# Patient Record
Sex: MALE | Race: WHITE | NOT HISPANIC OR LATINO
[De-identification: names, ages, dates, MRNs, and addresses within clinical notes are randomized per-mention and may not be internally consistent; named-entity substitution may affect disease eponyms.]

---
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
## 2021-12-21 ENCOUNTER — APPOINTMENT (OUTPATIENT)
Dept: URBAN - METROPOLITAN AREA CLINIC 186 | Age: 21
Setting detail: DERMATOLOGY
End: 2021-12-21

## 2021-12-21 DIAGNOSIS — D18.0 HEMANGIOMA: ICD-10-CM

## 2021-12-21 DIAGNOSIS — D22 MELANOCYTIC NEVI: ICD-10-CM

## 2021-12-21 DIAGNOSIS — Q83.3 ACCESSORY NIPPLE: ICD-10-CM

## 2021-12-21 DIAGNOSIS — L81.4 OTHER MELANIN HYPERPIGMENTATION: ICD-10-CM

## 2021-12-21 PROBLEM — D22.39 MELANOCYTIC NEVI OF OTHER PARTS OF FACE: Status: ACTIVE | Noted: 2021-12-21

## 2021-12-21 PROBLEM — D22.61 MELANOCYTIC NEVI OF RIGHT UPPER LIMB, INCLUDING SHOULDER: Status: ACTIVE | Noted: 2021-12-21

## 2021-12-21 PROBLEM — D22.5 MELANOCYTIC NEVI OF TRUNK: Status: ACTIVE | Noted: 2021-12-21

## 2021-12-21 PROBLEM — D18.01 HEMANGIOMA OF SKIN AND SUBCUTANEOUS TISSUE: Status: ACTIVE | Noted: 2021-12-21

## 2021-12-21 PROBLEM — D22.62 MELANOCYTIC NEVI OF LEFT UPPER LIMB, INCLUDING SHOULDER: Status: ACTIVE | Noted: 2021-12-21

## 2021-12-21 PROCEDURE — 99213 OFFICE O/P EST LOW 20 MIN: CPT

## 2021-12-21 PROCEDURE — OTHER COUNSELING: OTHER

## 2021-12-21 PROCEDURE — OTHER MIPS QUALITY: OTHER

## 2021-12-21 PROCEDURE — OTHER REASSURANCE: OTHER

## 2021-12-21 PROCEDURE — OTHER SUNSCREEN TREATMENT REGIMEN: OTHER

## 2021-12-21 PROCEDURE — OTHER SUNSCREEN RECOMMENDATIONS: OTHER

## 2021-12-21 ASSESSMENT — LOCATION SIMPLE DESCRIPTION DERM
LOCATION SIMPLE: RIGHT UPPER BACK
LOCATION SIMPLE: RIGHT CHEEK
LOCATION SIMPLE: RIGHT SCALP
LOCATION SIMPLE: RIGHT UPPER ARM
LOCATION SIMPLE: CHEST
LOCATION SIMPLE: ABDOMEN
LOCATION SIMPLE: LEFT UPPER ARM
LOCATION SIMPLE: LEFT UPPER BACK

## 2021-12-21 ASSESSMENT — LOCATION ZONE DERM
LOCATION ZONE: SCALP
LOCATION ZONE: FACE
LOCATION ZONE: ARM
LOCATION ZONE: TRUNK

## 2021-12-21 ASSESSMENT — LOCATION DETAILED DESCRIPTION DERM
LOCATION DETAILED: RIGHT LATERAL ABDOMEN
LOCATION DETAILED: PERIUMBILICAL SKIN
LOCATION DETAILED: RIGHT SUPERIOR UPPER BACK
LOCATION DETAILED: RIGHT ANTERIOR LATERAL DISTAL UPPER ARM
LOCATION DETAILED: RIGHT MEDIAL FRONTAL SCALP
LOCATION DETAILED: LEFT DISTAL LATERAL POSTERIOR UPPER ARM
LOCATION DETAILED: RIGHT INFERIOR CENTRAL MALAR CHEEK
LOCATION DETAILED: RIGHT ANTERIOR DISTAL UPPER ARM
LOCATION DETAILED: UPPER STERNUM
LOCATION DETAILED: LEFT SUPERIOR UPPER BACK

## 2021-12-21 NOTE — PROCEDURE: REASSURANCE
Include Location In Plan?: Yes
Hide Additional Notes?: No
Detail Level: Zone
Detail Level: Simple
Detail Level: Detailed
Detail Level: Generalized

## 2022-12-21 ENCOUNTER — APPOINTMENT (OUTPATIENT)
Dept: URBAN - METROPOLITAN AREA CLINIC 186 | Age: 22
Setting detail: DERMATOLOGY
End: 2022-12-21

## 2022-12-21 DIAGNOSIS — D22 MELANOCYTIC NEVI: ICD-10-CM

## 2022-12-21 DIAGNOSIS — L81.4 OTHER MELANIN HYPERPIGMENTATION: ICD-10-CM

## 2022-12-21 DIAGNOSIS — Q83.3 ACCESSORY NIPPLE: ICD-10-CM

## 2022-12-21 DIAGNOSIS — D18.0 HEMANGIOMA: ICD-10-CM

## 2022-12-21 PROBLEM — D22.61 MELANOCYTIC NEVI OF RIGHT UPPER LIMB, INCLUDING SHOULDER: Status: ACTIVE | Noted: 2022-12-21

## 2022-12-21 PROBLEM — D22.39 MELANOCYTIC NEVI OF OTHER PARTS OF FACE: Status: ACTIVE | Noted: 2022-12-21

## 2022-12-21 PROBLEM — D22.5 MELANOCYTIC NEVI OF TRUNK: Status: ACTIVE | Noted: 2022-12-21

## 2022-12-21 PROBLEM — D18.01 HEMANGIOMA OF SKIN AND SUBCUTANEOUS TISSUE: Status: ACTIVE | Noted: 2022-12-21

## 2022-12-21 PROBLEM — D22.0 MELANOCYTIC NEVI OF LIP: Status: ACTIVE | Noted: 2022-12-21

## 2022-12-21 PROCEDURE — OTHER SUNSCREEN RECOMMENDATIONS: OTHER

## 2022-12-21 PROCEDURE — OTHER SUNSCREEN TREATMENT REGIMEN: OTHER

## 2022-12-21 PROCEDURE — OTHER ADDITIONAL NOTES: OTHER

## 2022-12-21 PROCEDURE — 99213 OFFICE O/P EST LOW 20 MIN: CPT

## 2022-12-21 PROCEDURE — OTHER REASSURANCE: OTHER

## 2022-12-21 PROCEDURE — OTHER COUNSELING: OTHER

## 2022-12-21 ASSESSMENT — LOCATION SIMPLE DESCRIPTION DERM
LOCATION SIMPLE: LEFT UPPER BACK
LOCATION SIMPLE: RIGHT CHEEK
LOCATION SIMPLE: ABDOMEN
LOCATION SIMPLE: RIGHT UPPER ARM
LOCATION SIMPLE: RIGHT UPPER BACK
LOCATION SIMPLE: RIGHT LIP
LOCATION SIMPLE: CHEST
LOCATION SIMPLE: RIGHT SCALP

## 2022-12-21 ASSESSMENT — LOCATION DETAILED DESCRIPTION DERM
LOCATION DETAILED: RIGHT ANTERIOR LATERAL DISTAL UPPER ARM
LOCATION DETAILED: RIGHT LATERAL ABDOMEN
LOCATION DETAILED: RIGHT MEDIAL FRONTAL SCALP
LOCATION DETAILED: RIGHT ANTERIOR DISTAL UPPER ARM
LOCATION DETAILED: RIGHT INFERIOR CENTRAL MALAR CHEEK
LOCATION DETAILED: PERIUMBILICAL SKIN
LOCATION DETAILED: RIGHT SUPERIOR UPPER BACK
LOCATION DETAILED: LEFT SUPERIOR UPPER BACK
LOCATION DETAILED: UPPER STERNUM
LOCATION DETAILED: RIGHT LOWER CUTANEOUS LIP

## 2022-12-21 ASSESSMENT — LOCATION ZONE DERM
LOCATION ZONE: LIP
LOCATION ZONE: TRUNK
LOCATION ZONE: FACE
LOCATION ZONE: SCALP
LOCATION ZONE: ARM

## 2022-12-21 NOTE — PROCEDURE: ADDITIONAL NOTES
Render Risk Assessment In Note?: no
Detail Level: Simple
Additional Notes: Biopsy proven benign intradermal Nevus
Additional Notes: Discussed shave removal if lesion is bothersome and that no treatment is necessary

## 2023-09-22 NOTE — HPI: EVALUATION OF SKIN LESION(S)
Peripheral IV    Performed by: Karma Trevino MD  Authorized by: Karma Trevino MD    Size:  22 G  Laterality:  Left  Location:  Hand  Local Anesthetic:  None  Site Prep:  Alcohol  Technique:  Anatomical landmarks  Attempts:  1  Start Time: 9/22/2023 12:36 PM      
What Type Of Note Output Would You Prefer (Optional)?: Bullet Format
How Severe Are Your Spot(S)?: mild
Have Your Spot(S) Been Treated In The Past?: has not been treated
Hpi Title: Evaluation of Skin Lesions